# Patient Record
Sex: MALE | Race: WHITE | Employment: FULL TIME | URBAN - METROPOLITAN AREA
[De-identification: names, ages, dates, MRNs, and addresses within clinical notes are randomized per-mention and may not be internally consistent; named-entity substitution may affect disease eponyms.]

---

## 2021-11-20 ENCOUNTER — HOSPITAL ENCOUNTER (EMERGENCY)
Facility: CLINIC | Age: 31
Discharge: ANOTHER HOSPITAL | End: 2021-11-20
Attending: EMERGENCY MEDICINE | Admitting: EMERGENCY MEDICINE
Payer: COMMERCIAL

## 2021-11-20 ENCOUNTER — APPOINTMENT (OUTPATIENT)
Dept: GENERAL RADIOLOGY | Facility: CLINIC | Age: 31
End: 2021-11-20
Attending: EMERGENCY MEDICINE
Payer: COMMERCIAL

## 2021-11-20 VITALS
HEART RATE: 95 BPM | OXYGEN SATURATION: 95 % | DIASTOLIC BLOOD PRESSURE: 94 MMHG | TEMPERATURE: 98 F | SYSTOLIC BLOOD PRESSURE: 156 MMHG | RESPIRATION RATE: 19 BRPM

## 2021-11-20 DIAGNOSIS — S92.102A CLOSED DISPLACED FRACTURE OF LEFT TALUS, UNSPECIFIED PORTION OF TALUS, INITIAL ENCOUNTER: ICD-10-CM

## 2021-11-20 DIAGNOSIS — S93.05XA ANKLE DISLOCATION, LEFT, INITIAL ENCOUNTER: ICD-10-CM

## 2021-11-20 LAB
ANION GAP SERPL CALCULATED.3IONS-SCNC: 4 MMOL/L (ref 3–14)
BUN SERPL-MCNC: 17 MG/DL (ref 7–30)
CALCIUM SERPL-MCNC: 9 MG/DL (ref 8.5–10.1)
CHLORIDE BLD-SCNC: 107 MMOL/L (ref 94–109)
CO2 SERPL-SCNC: 29 MMOL/L (ref 20–32)
CREAT SERPL-MCNC: 1.14 MG/DL (ref 0.66–1.25)
ERYTHROCYTE [DISTWIDTH] IN BLOOD BY AUTOMATED COUNT: 13.1 % (ref 10–15)
GFR SERPL CREATININE-BSD FRML MDRD: 84 ML/MIN/1.73M2
GLUCOSE BLD-MCNC: 110 MG/DL (ref 70–99)
HCT VFR BLD AUTO: 47.7 % (ref 40–53)
HGB BLD-MCNC: 15.5 G/DL (ref 13.3–17.7)
MCH RBC QN AUTO: 29.9 PG (ref 26.5–33)
MCHC RBC AUTO-ENTMCNC: 32.5 G/DL (ref 31.5–36.5)
MCV RBC AUTO: 92 FL (ref 78–100)
PLATELET # BLD AUTO: 213 10E3/UL (ref 150–450)
POTASSIUM BLD-SCNC: 4 MMOL/L (ref 3.4–5.3)
RBC # BLD AUTO: 5.18 10E6/UL (ref 4.4–5.9)
SARS-COV-2 RNA RESP QL NAA+PROBE: NEGATIVE
SODIUM SERPL-SCNC: 140 MMOL/L (ref 133–144)
WBC # BLD AUTO: 11 10E3/UL (ref 4–11)

## 2021-11-20 PROCEDURE — 28435 CLTX TALUS FRACTURE W/MNPJ: CPT | Mod: LT

## 2021-11-20 PROCEDURE — 96376 TX/PRO/DX INJ SAME DRUG ADON: CPT

## 2021-11-20 PROCEDURE — 80048 BASIC METABOLIC PNL TOTAL CA: CPT | Performed by: EMERGENCY MEDICINE

## 2021-11-20 PROCEDURE — C9803 HOPD COVID-19 SPEC COLLECT: HCPCS

## 2021-11-20 PROCEDURE — 85027 COMPLETE CBC AUTOMATED: CPT | Performed by: EMERGENCY MEDICINE

## 2021-11-20 PROCEDURE — 96374 THER/PROPH/DIAG INJ IV PUSH: CPT

## 2021-11-20 PROCEDURE — 87635 SARS-COV-2 COVID-19 AMP PRB: CPT | Performed by: EMERGENCY MEDICINE

## 2021-11-20 PROCEDURE — 250N000011 HC RX IP 250 OP 636: Performed by: EMERGENCY MEDICINE

## 2021-11-20 PROCEDURE — 36415 COLL VENOUS BLD VENIPUNCTURE: CPT | Performed by: EMERGENCY MEDICINE

## 2021-11-20 PROCEDURE — 999N000065 XR ANKLE LEFT G/E 3 VIEWS

## 2021-11-20 PROCEDURE — 96375 TX/PRO/DX INJ NEW DRUG ADDON: CPT

## 2021-11-20 PROCEDURE — 73610 X-RAY EXAM OF ANKLE: CPT | Mod: LT

## 2021-11-20 PROCEDURE — 99285 EMERGENCY DEPT VISIT HI MDM: CPT | Mod: 25

## 2021-11-20 RX ORDER — MORPHINE SULFATE 4 MG/ML
8 INJECTION, SOLUTION INTRAMUSCULAR; INTRAVENOUS ONCE
Status: COMPLETED | OUTPATIENT
Start: 2021-11-20 | End: 2021-11-20

## 2021-11-20 RX ORDER — HYDROMORPHONE HYDROCHLORIDE 1 MG/ML
0.5 INJECTION, SOLUTION INTRAMUSCULAR; INTRAVENOUS; SUBCUTANEOUS
Status: DISCONTINUED | OUTPATIENT
Start: 2021-11-20 | End: 2021-11-21 | Stop reason: HOSPADM

## 2021-11-20 RX ORDER — FENTANYL CITRATE 50 UG/ML
100 INJECTION, SOLUTION INTRAMUSCULAR; INTRAVENOUS ONCE
Status: COMPLETED | OUTPATIENT
Start: 2021-11-20 | End: 2021-11-20

## 2021-11-20 RX ADMIN — HYDROMORPHONE HYDROCHLORIDE 0.5 MG: 1 INJECTION, SOLUTION INTRAMUSCULAR; INTRAVENOUS; SUBCUTANEOUS at 20:56

## 2021-11-20 RX ADMIN — HYDROMORPHONE HYDROCHLORIDE 1 MG: 1 INJECTION, SOLUTION INTRAMUSCULAR; INTRAVENOUS; SUBCUTANEOUS at 21:13

## 2021-11-20 RX ADMIN — MORPHINE SULFATE 8 MG: 4 INJECTION INTRAVENOUS at 19:40

## 2021-11-20 RX ADMIN — HYDROMORPHONE HYDROCHLORIDE 0.5 MG: 1 INJECTION, SOLUTION INTRAMUSCULAR; INTRAVENOUS; SUBCUTANEOUS at 22:32

## 2021-11-20 RX ADMIN — FENTANYL CITRATE 100 MCG: 50 INJECTION INTRAMUSCULAR; INTRAVENOUS at 20:13

## 2021-11-20 ASSESSMENT — ENCOUNTER SYMPTOMS
ARTHRALGIAS: 1
HEADACHES: 0
BACK PAIN: 0
NUMBNESS: 0
NECK PAIN: 0

## 2021-11-21 NOTE — ED PROVIDER NOTES
History   Chief Complaint:  Fall and Ankle Pain     HPI   Julito Ayala is a 31 year old male, otherwise healthy, who presents with left ankle pain following a fall. The patient reports that earlier today he was descending from a ladder after putting up lights when his left foot got caught causing him to fall. He fell about 5 feet and has since had pain and deformity to his left ankle. The patient did not hit his head an states that he landed on his buttocks and back. He did not lose consciousness.     Review of Systems   Musculoskeletal: Positive for arthralgias (L ankle). Negative for back pain and neck pain.   Skin: Negative.    Neurological: Negative for syncope, numbness and headaches.   All other systems reviewed and are negative.    Allergies:  The patient has no known allergies.     Medications:  The patient is not currently taking any prescribed medications.    Past Medical History:     The patient denies any past medical history.     Past Surgical History:    The patient denies past surgical history.     Family History:    Noncontributory    Social History:  The patient presents with his significant other.     Physical Exam     Patient Vitals for the past 24 hrs:   BP Temp Pulse Resp SpO2   11/20/21 2115 (!) 145/88 -- 82 -- 99 %   11/20/21 2030 128/72 -- 74 -- 95 %   11/20/21 1945 135/79 -- 79 -- 99 %   11/20/21 1913 (!) 147/89 98  F (36.7  C) 84 24 100 %       Physical Exam  General: Adult male laying on the stretcher  Eyes: PERRL, Conjunctive within normal limits  ENT: Moist mucous membranes, oropharynx clear.   Neck: normal spontaneous ROM  CV: Normal S1S2. Regular rate and rhythm. DP and PT pulses intact left foot and ankle  Resp: Normal respiratory effort.  GI: Abdomen is soft, nontender and nondistended.   MSK: Visible deformity of the left ankle with associated soft tissue edema. Nontender over the more proximal LLE.   Skin: Warm and dry. No rashes or lesions or ecchymoses on visible skin. CR  of toes of the left foot <2sec.  Neuro: Alert and oriented. Responds appropriately to all questions and commands. No focal findings appreciated. Normal muscle tone. Sensation intact to light touch over the dorsal foot and toe webs.   Psych: Appropriate mood and affect.     Emergency Department Course     Imaging:  XR Ankle Left G/E 3 Views  There is a subtalar joint dislocation of the left hindfoot with fractures of the medial and likely posterior malleolus although the overlapping bones make this difficult to evaluate. The talus is seen lateral to the calcaneus on this examination. The fibula appears intact.  Reading per radiology.    XR Ankle Left G/E 3 Views (Post Reduction)  There is a fracture through the talus. A portion of the talus articulates with the tibial plafond but there is a significant portion of the anterior talus which is still subluxed and deviated laterally. However, there is improved alignment at the ankle joint when compared to the previous exam. Fracture of the medial malleolus is not significantly displaced.  Reading per radiology.    Laboratory:  CBC: WBC 11.0, HGB 15.5,     BMP: Glucose 110 (H) o/w WNL (Creatinine 1.14)      Asymptomatic COVID19 Virus PCR by nasopharyngeal swab: Pending    Procedures    Fracture Reduction     SITE: Left ankle      CONSENT: Risks and benefits, along with alternative treatment modalities, were discussed with the patient.  The patient consented to the procedure verbally and signed the proper paperwork.    ANESTHESIA:  The patient was given 100 mcg of Fentanyl for pain control.     TECHNIQUE: Traction was applied and angulation was recreated and reduced, suspect partial reduction.  The patient tolerated the procedure well and there were no complications. Postreduction xray obtained.    OUTCOME:  Rechecked the patient , findings and plan explained to the patient. Patients status improved but still in pain. Distal pulses intact, sensation intact.      Emergency Department Course:  Reviewed:  I reviewed nursing notes, vitals and past medical history    Assessments:  1925 I obtained history and examined the patient as noted above.   2009 I rechecked the patient and explained findings. I then performed a fracture reduction as documented above.   2201 I rechecked and updated the patient.  The patient states that his pain is more like a burning sensation than a sharp pain.   2206 I spoke with the patient and discussed the recommendations of orthopedics.     Consults:  2204 I spoke with Dr. Park, orthopedic trauma surgery, regarding the patient. Dr. Park would like the patient transferred to a level 1 trauma center.   2215 I spoke with Dr. Kelly, trauma surgery, regarding the patient.  2220 I spoke with Dr. Pulido, emergency medicine, regarding the patient. They accepted the patient for transfer.     Interventions:  1940 Morphine 8 mg IV  2013 Fentanyl 100 mcg IV   2056 Dilaudid 0.5 mg IV   2113 Dilaudid 1 mg IV   2232 Dilaudid 0.5 mg IV     Disposition:  The patient was transferred to [facility] via ambulance. Dr. Pulido accepted the patient for transfer.     Impression & Plan     Medical Decision Making:  Julito Ayala is a 31 year old male who presents for evaluation of ankle pain after falling from a ladder. CMS is intact distally in the extremity.  Pulses are normal and there are no signs of serious sequelae including compartment syndrome of the leg or foot.  Xrays reveal an ankle fracture that does need reduction; this is partially successful, see above procedure note. There are no signs of neurovascular compromise before or after reduction. The patients head to toe trauma exam is otherwise normal at this time and no further trauma workup is needed as I believe there is no signs of serious head, neck, chest, spinal, extremity, pelvic or abdominal injuries.  I consulted with Dr. Park of [facility] orthopedics who felt that this complex fracture  would be more appropriately taking care of a level 1 trauma center, given the higher rates of complications with this type of fracture dislocation.  The patient felt comfortable this plan.  He felt most comfortable with [facility] care.  He was transferred there in stable condition.    Diagnosis:    ICD-10-CM    1. Closed displaced fracture of left talus, unspecified portion of talus, initial encounter  S92.102A    2. Ankle dislocation, left, initial encounter  S93.05XA      Scribe Disclosure:  I, Chaparro Palomino, am serving as a scribe at 7:16 PM on 11/20/2021 to document services personally performed by Lilliana Charles MD based on my observations and the provider's statements to me.              Lilliana Charles MD  11/21/21 0008

## 2021-11-21 NOTE — ED TRIAGE NOTES
Pt slipped off ladder, and left foot got caught in rung of ladder and leg twisted. Deformity to left ankle. Pt able to move toes. Landed on back in grass. Denies hitting head. Pt states ladder was 10 feet tall, but his foot got caught about prison down so he did not fall from 10 feet. ABC intact.